# Patient Record
Sex: FEMALE | Race: WHITE | NOT HISPANIC OR LATINO | URBAN - METROPOLITAN AREA
[De-identification: names, ages, dates, MRNs, and addresses within clinical notes are randomized per-mention and may not be internally consistent; named-entity substitution may affect disease eponyms.]

---

## 2017-08-31 ENCOUNTER — APPOINTMENT (OUTPATIENT)
Age: 57
Setting detail: DERMATOLOGY
End: 2017-09-05

## 2017-08-31 DIAGNOSIS — Z41.1 ENCOUNTER FOR COSMETIC SURGERY: ICD-10-CM

## 2017-08-31 PROCEDURE — OTHER CONSULTATION - FILLERS: OTHER

## 2017-08-31 PROCEDURE — OTHER FILLERS: OTHER

## 2017-08-31 NOTE — PROCEDURE: CONSULTATION - FILLERS
Dorsal Hands Secondary Filler Volume In Cc (Estimated): 0
Send Procedure Quote As Charge: No
Detail Level: Detailed

## 2021-07-01 ENCOUNTER — APPOINTMENT (OUTPATIENT)
Age: 61
Setting detail: DERMATOLOGY
End: 2021-07-01

## 2021-07-01 DIAGNOSIS — Z41.9 ENCOUNTER FOR PROCEDURE FOR PURPOSES OTHER THAN REMEDYING HEALTH STATE, UNSPECIFIED: ICD-10-CM

## 2021-07-01 DIAGNOSIS — Z02.89 ENCOUNTER FOR OTHER ADMINISTRATIVE EXAMINATIONS: ICD-10-CM

## 2021-07-01 PROCEDURE — OTHER PRODUCT LINE (ANTI-AGING): OTHER

## 2021-07-01 PROCEDURE — OTHER MEDICAL CONSULTATION: FILLERS: OTHER

## 2021-07-01 PROCEDURE — OTHER BOTOX: OTHER

## 2021-07-01 ASSESSMENT — LOCATION DETAILED DESCRIPTION DERM
LOCATION DETAILED: RIGHT CHIN
LOCATION DETAILED: LEFT MID TEMPLE
LOCATION DETAILED: RIGHT SUPERIOR LATERAL MALAR CHEEK
LOCATION DETAILED: LEFT SUPERIOR LATERAL NECK
LOCATION DETAILED: LEFT TRAGUS
LOCATION DETAILED: RIGHT INFERIOR TEMPLE
LOCATION DETAILED: LEFT LATERAL MALAR CHEEK
LOCATION DETAILED: LEFT LATERAL SUBMANDIBULAR CHEEK
LOCATION DETAILED: LEFT SUPERIOR CENTRAL MALAR CHEEK
LOCATION DETAILED: RIGHT LATERAL MALAR CHEEK
LOCATION DETAILED: RIGHT LATERAL SUBMANDIBULAR CHEEK
LOCATION DETAILED: LEFT CHIN
LOCATION DETAILED: RIGHT LATERAL BUCCAL CHEEK
LOCATION DETAILED: RIGHT MID TEMPLE
LOCATION DETAILED: LEFT LATERAL BUCCAL CHEEK
LOCATION DETAILED: RIGHT SUPERIOR LATERAL NECK
LOCATION DETAILED: LEFT CENTRAL LATERAL NECK
LOCATION DETAILED: LEFT LATERAL CANTHUS
LOCATION DETAILED: LEFT CENTRAL MALAR CHEEK
LOCATION DETAILED: RIGHT CENTRAL MALAR CHEEK

## 2021-07-01 ASSESSMENT — LOCATION SIMPLE DESCRIPTION DERM
LOCATION SIMPLE: CHIN
LOCATION SIMPLE: LEFT EYELID
LOCATION SIMPLE: LEFT EAR
LOCATION SIMPLE: RIGHT CHEEK
LOCATION SIMPLE: LEFT TEMPLE
LOCATION SIMPLE: RIGHT TEMPLE
LOCATION SIMPLE: NECK
LOCATION SIMPLE: LEFT CHEEK

## 2021-07-01 ASSESSMENT — LOCATION ZONE DERM
LOCATION ZONE: NECK
LOCATION ZONE: EYELID
LOCATION ZONE: FACE
LOCATION ZONE: EAR

## 2021-07-01 NOTE — PROCEDURE: PRODUCT LINE (ANTI-AGING)
Product 28 Price (In Dollars - Numeric Only, No Special Characters Or $): 0.00
Product 75 Units: 0
Product 2 Application Directions: Apply to entire face every morning. Follow at moisturizer and sunblock. Mix wt Retin A the nights she uses Retin A. 7 nights a week till product is gone or 5 months pass.
Name Of Product 2: Pigmanet control blend.
Product 1 Application Directions: Angle size to clean skin  3 nights a week as tolerated. Follow by moisturizer or mix at night moisturizer if needed.  Mix wt pigment control blend  those 203 nights
Send Charges To Patient Encounter: Yes
Name Of Product 1: Retin A 0.5 percent
Risk Of Complication Category: Minimal
Assigning Risk Information: Per AMA, level of risk is based upon consequences of the problem(s) addressed at the encounter when appropriately treated. Risk also includes medical decision making related to the need to initiate or forego further testing, treatment and/or hospitalization. Over the counter medication are assigned a risk level of low. Prescription medication management is assigned a risk level of moderate.
Name Of Product 5: Hydrating cream
Product 3 Application Directions: Apply to a clean face every night when not using retin a
Detail Level: Zone
Product 2 Price (In Dollars - Numeric Only, No Special Characters Or $): 72
Product 5 Application Directions: Apply every morning n night. In am follow by sunblock.
Product 1 Price (In Dollars - Numeric Only, No Special Characters Or $): 40.00

## 2021-07-01 NOTE — PROCEDURE: BOTOX
Price (Use Numbers Only, No Special Characters Or $): 304 Price (Use Numbers Only, No Special Characters Or $): 505

## 2021-07-01 NOTE — HPI: OTHER
Condition:: Wishes to start Retin A and HQ
Please Describe Your Condition:: Purchasing Retin A and HQ